# Patient Record
(demographics unavailable — no encounter records)

---

## 2024-11-04 NOTE — HISTORY OF PRESENT ILLNESS
[7] : 7 [5] : 5 [FreeTextEntry5] : 59 y.o patient is here for fu of his left knee today. PT 1x a week. pt states gel injections didn't provide much relief. pt states lateral knee is bulging.

## 2024-11-04 NOTE — ASSESSMENT
[FreeTextEntry1] : The patient comes in today for follow-up on his left knee.  He has persistent worsening pain in the left knee.  He has now had a series of Euflexxa injections recently which have not helped and cortisone previously.  Neither 1 of those things have helped him in physical therapy made him somewhat worse.  He has pain walking distances, doing stairs and occasionally at night.  He has noticed an increase deformity over the anterolateral portion of the knee as well as worsening of his varus deformity.  He struggles going from sitting to standing and getting in and out of a car.  Examination of the left lower extremity was normal neurovascular exam.  Examination left knee reveals limited range of motion from 5 to 115 degrees of varus deformity.  He has prominence along the anterolateral portion of the knee that he does not appear to have had previously.  He has a slight effusion.  There is no instability.  There is no apprehension.  There is no redness, rashes or visible scars.  Repeat of his x-rays today of the left knee 4 views weightbearing are unchanged from previous x-rays.  He has severe bone-on-bone arthritis of the medial compartment with tricompartmental changes.  He has severe varus deformity and subluxation.  There are no obvious tumors, mass or calcifications seen.  The plan at this time is to proceed with a left total knee replacement using the attune primary revision device.  I will get an MRI of the left knee just to make sure that the increased prominence anterolaterally is nothing of concern.  It may just end up being a meniscal cyst.  He has failed more than 3 months of conservative management.  All risks, benefits and alternatives of the procedure were discussed with the patient in detail to include but not limited to infection, blood clots, knee stiffness, and premature loosening of the implant and he wishes to proceed.  The patient has arthritis and end-stage joint disease of the knee supported by x-ray that demonstrated  the following: Periarticular osteophytes;  joint space narrowing; bone-on-bone articulation;  joint subluxation;  subchondral cysts;  and subchondral sclerosis.  One or more of the following conservative treatments have been tried and failed for 3 months or more: Anti-inflammatory medication; or analgesic medication; or at home exercises; or physical therapy; or the use of walker or cane; or weight loss; or use of a brace; or cortisone shot; or viscosupplementation therapies  The patient does not have any the following contraindications for joint replacement surgery: Active infection; or active systemic bacteremia; or active skin infection or open wound at surgical site; or neuropathic arthritis; or severe, rapidly progressive neurological disease; or severe medical conditions that make the risk of surgery outweigh the potential benefit.  I reviewed the plan of care as well as a model of the total knee implant equivalent to the one that would be used with a total knee replacement.  The patient agreed to the plan of care as well as use of implants for their total knee joint replacement.

## 2025-01-14 NOTE — PHYSICAL EXAM
[Well Developed] : well developed [Well Nourished] : well nourished [No Acute Distress] : no acute distress [Normal Conjunctiva] : normal conjunctiva [Normal Venous Pressure] : normal venous pressure [No Carotid Bruit] : no carotid bruit [Normal S1, S2] : normal S1, S2 [No Rub] : no rub [No Gallop] : no gallop [Clear Lung Fields] : clear lung fields [Good Air Entry] : good air entry [No Respiratory Distress] : no respiratory distress  [Soft] : abdomen soft [Non Tender] : non-tender [No Masses/organomegaly] : no masses/organomegaly [Normal Bowel Sounds] : normal bowel sounds [Normal Gait] : normal gait [No Edema] : no edema [No Cyanosis] : no cyanosis [No Clubbing] : no clubbing [No Varicosities] : no varicosities [No Rash] : no rash [No Skin Lesions] : no skin lesions [Moves all extremities] : moves all extremities [No Focal Deficits] : no focal deficits [Normal Speech] : normal speech [Alert and Oriented] : alert and oriented [Normal memory] : normal memory [de-identified] : +sys murmur

## 2025-01-14 NOTE — PHYSICAL EXAM
[No Acute Distress] : no acute distress [Well-Appearing] : well-appearing [Normal Sclera/Conjunctiva] : normal sclera/conjunctiva [Normal Outer Ear/Nose] : the outer ears and nose were normal in appearance [Normal Oropharynx] : the oropharynx was normal [No Lymphadenopathy] : no lymphadenopathy [Supple] : supple [No Respiratory Distress] : no respiratory distress  [Clear to Auscultation] : lungs were clear to auscultation bilaterally [Normal Rate] : normal rate  [Regular Rhythm] : with a regular rhythm [Normal S1, S2] : normal S1 and S2 [Grossly Normal Strength/Tone] : grossly normal strength/tone [Coordination Grossly Intact] : coordination grossly intact [Normal Affect] : the affect was normal [Normal Insight/Judgement] : insight and judgment were intact [de-identified] : slight lateral left knee swelling, crepitus appreciated with flexion and extension, negative anterior drawer test

## 2025-01-14 NOTE — DISCUSSION/SUMMARY
[EKG obtained to assist in diagnosis and management of assessed problem(s)] : EKG obtained to assist in diagnosis and management of assessed problem(s) [FreeTextEntry1] : Pt is a 61 y/o M smoker PMH HLD (has not started statin) family hx: mother MI 44, brother CAD/PCI 60's/"leaky valve", sister "leaky valve" .   Pt is scheduled for L knee surgery 01/21/2025 at  with Dr Marshall  Will check transthoracic echocardiogram to evaluate left ventricular function and assess for any structural abnormalities Further preop recommendations will be made once diagnostic testing is complete.   Family History: We discussed the importance of aggressive risk factor modification, including continuing medications as directed, following a healthy diet of unprocessed, low saturated fat and carbohydrate diet as close to a plant based or Mediterranean as possible, regular exercise at least 30 minutes of cardiovascular exercise daily.  Also advised to report any symptoms immediately.   HLD: c/w statin Advised lifestyle modifications   Most recent available lab results were reviewed with pt. The described plan was discussed with the pt.  All questions and concerns were addressed to the best of my knowledge.

## 2025-01-14 NOTE — HISTORY OF PRESENT ILLNESS
[FreeTextEntry1] : Pt is a 61 y/o M smoker PMH HLD (has not started statin) family hx: mother MI 44, brother CAD/PCI 60's/"leaky valve", sister "leaky valve" .   Pt is scheduled for L knee surgery 01/21/2025 at  with Dr Marshall Pt reports feeling well and has no active cardiac complaints - denies CP, SOB, palpitations, dizziness, syncope, edema, orthopnea, PND, orthopnea.  No exertional symptoms.  ETT 11/2023 no ischemic changes 10.1 METS CUS 09/2023 mild plaque, sluggish flow CTV head 09/2023 normal intracranial CT venogram  PMH: anxiety, asthma Family hx: mother MI 44, brother CAD/PCI 60's/"leaky valve", sister "leaky valve" Smoking status: 8-9 cigarettes social ETOH no drug use Current exercise: none  Daily water intake: 1 quart Daily caffeine intake: 4-5 cups coffee OTC medications: advil PRN Previous hospitalizations: shoulder surgery x2 - no problems with anesthesia

## 2025-01-14 NOTE — ASSESSMENT
[Patient Optimized for Surgery] : Patient optimized for surgery [Echocardiogram] : echocardiogram [Modify medications prior to procedure] : Modify medications prior to procedure [As per surgery] : as per surgery [FreeTextEntry4] : 59yo male with PMH of asthma, HLD, current smoker who presents to the office for preoperative examination. He is planned for left total knee replacement with Dr. Michael Marshall on 1/21/2025 at [facility].  - [facility] labs, ECG and CXR were obtained and personally reviewed - Labs within normal limits. MSSA PCR with staph aureus detected. Sent mupirocin from [facility] department  - ECG shows sinus bradycardia, rate 56 with voltage criteria for LVH. Cardiologist planning for TTE to evaluated LV function and assess structural abnormalities - CXR with nondisplaced fractures of the right eigth and ninth ribs. Okay for clearance - No adverse anesthesia reactions in the past - Able to perform >4 METs at baseline - At this time there are no acute medical contraindications to procedure and patient is medically optimized to proceed with planned procedure once cardiology clearance is received  [FreeTextEntry7] : hold NSAIDs prior to procedure

## 2025-01-14 NOTE — REVIEW OF SYSTEMS
[Fever] : no fever [Chills] : no chills [Earache] : no earache [Nasal Discharge] : no nasal discharge [Sore Throat] : no sore throat [Chest Pain] : no chest pain [Palpitations] : no palpitations [Shortness Of Breath] : no shortness of breath [Cough] : no cough [Joint Pain] : joint pain [Joint Stiffness] : joint stiffness [Joint Swelling] : joint swelling [Headache] : no headache [Dizziness] : no dizziness

## 2025-01-14 NOTE — HISTORY OF PRESENT ILLNESS
[Asthma] : asthma [Smoker] : smoker [Aortic Stenosis] : no aortic stenosis [Atrial Fibrillation] : no atrial fibrillation [Coronary Artery Disease] : no coronary artery disease [Recent Myocardial Infarction] : no recent myocardial infarction [Implantable Device/Pacemaker] : no implantable device/pacemaker [COPD] : no COPD [Sleep Apnea] : no sleep apnea [Family Member] : no family member with adverse anesthesia reaction/sudden death [Self] : no previous adverse anesthesia reaction [Chronic Anticoagulation] : no chronic anticoagulation [Chronic Kidney Disease] : no chronic kidney disease [Diabetes] : no diabetes [(Patient denies any chest pain, claudication, dyspnea on exertion, orthopnea, palpitations or syncope)] : Patient denies any chest pain, claudication, dyspnea on exertion, orthopnea, palpitations or syncope [Good (7-10 METs)] : Good (7-10 METs) [FreeTextEntry1] : Left total knee replacement [FreeTextEntry2] : 1/21/25 [FreeTextEntry3] : Dr Michael Marshall [FreeTextEntry4] : 59yo male with PMH of asthma, HLD, current smoker who presents to the office for preoperative examination.   Patient with history of left knee osteoarthritis. Has been following up with orthopedics, underwent gel injections and cortisone injections without improvement. He is planned for left total knee replacement with Dr. Michael Marshall on 1/21/2025 at [facility].   At [facility], patient with ecchymosis on the right lateral ribcage s/p fall. He underwent CXR which revealed nondisplaced fractures of the right eighth and ninth ribs. Reports a fall on a windowsill a few weeks ago. Pain is improving, avoiding NSAIDs.  [FreeTextEntry7] : Cardiology Dr. Berrios 1/14/25: ECG with sinus bradycardia, rate 56 with voltage criteria for LVH. Cardiologist planning for TTE to evaluated LV function and assess structural abnormalities

## 2025-02-21 NOTE — ASSESSMENT
[FreeTextEntry1] : The patient comes in today for follow-up on his left knee.  He is now 4 weeks out from his left total knee replacement done on January 21, 2025.  He is doing very well.  He walks mostly without a cane or assistive device.  He still having some trouble with flexion.  He is currently doing physical therapy.  Examination of his left lower extremity reveals normal neurovascular exam.  Examination left knee reveals well-healed midline scar with no signs of infection or DVT.  His range of motion is 0 to 95 degrees.  He has normal patella tracking.  There is no crepitation or instability.  There is no redness or rashes.  X-rays done in the office today left knee 4 views show the left total knee prosthesis normal alignment in good position with no signs of loosening or wear.  There are no obvious tumors, masses or calcifications seen.  The plan at this time is physical therapy.  I will see him back in the office in 2 months.

## 2025-04-16 NOTE — ASSESSMENT
[FreeTextEntry1] : The patient comes in today follow-up on his total knee replacement.  He is now almost 3 months out from his left total knee replacement done on January 21, 2025.  He is doing very well.  He is getting himself back to work.  He stopped his physical therapy because of the co-pay.  He is doing home exercises.  Examination of the left lower extremity feels normal neurovascular exam.  Examination of the left knee reveals a well-healed midline scar.  There is no effusion.  There is no signs of infection or DVT.  His range of motion is 0 to 115 degrees.  I can push him to 120 with some mild discomfort.  He has normal patella tracking with no apprehension or crepitation.  There is no instability.  The plan at this time is to continue home exercises.  He will resume activities as tolerated.  I will see him back in the office as needed.

## 2025-05-19 NOTE — REVIEW OF SYSTEMS
[Abdominal Pain] : abdominal pain [Muscle Pain] : muscle pain [Fever] : no fever [Chills] : no chills [Nausea] : no nausea [Constipation] : no constipation [Diarrhea] : no diarrhea [Vomiting] : no vomiting [Dysuria] : no dysuria [Frequency] : no frequency

## 2025-05-19 NOTE — ASSESSMENT
[FreeTextEntry1] : #Inguinal pain/bulge - Presenting with left sided inguinal bulging for the past week [facility] - Hernia present on examination - Will check US to confirm - General surgery referral

## 2025-05-19 NOTE — HISTORY OF PRESENT ILLNESS
[FreeTextEntry8] : 61yo male presenting to the office complaining of inguinal bulge. Reports that he tweaked his back a week ago, was lifting up something heavy at an awkward position.  Reports feeling bulge in his left pelvic region with pain.  Denies changes in bowel habits, nausea or vomiting.  Thought it was muscular at first, but now with cramping.

## 2025-05-19 NOTE — PHYSICAL EXAM
[No Acute Distress] : no acute distress [Well-Appearing] : well-appearing [Soft] : abdomen soft [Non Tender] : non-tender [Normal Affect] : the affect was normal [Normal Insight/Judgement] : insight and judgment were intact [de-identified] : left inguinal bulge, reducible

## 2025-07-29 NOTE — DISCUSSION/SUMMARY
[EKG obtained to assist in diagnosis and management of assessed problem(s)] : EKG obtained to assist in diagnosis and management of assessed problem(s) [FreeTextEntry1] : Pt is a 61 y/o M smoker PMH HLD (has not started statin) family hx: mother MI 44, brother CAD/PCI 60's/"leaky valve", sister "leaky valve" .   This past weekend he had an episode of CP at work while active, non-radiating, lasted a few minutes. check CCTA Advised pt to go to the nearest ED if symptoms persist or worsen  Family History: We discussed the importance of aggressive risk factor modification, including continuing medications as directed, following a healthy diet of unprocessed, low saturated fat and carbohydrate diet as close to a plant based or Mediterranean as possible, regular exercise at least 30 minutes of cardiovascular exercise daily.  Also advised to report any symptoms immediately.   HLD: c/w crestor 10mg qd check labs Advised lifestyle modifications   Most recent available lab results were reviewed with pt. The described plan was discussed with the pt.  All questions and concerns were addressed to the best of my knowledge.     Today's office visit encompassed 32 minutes which excludes teaching and separately reported services. I conducted an extensive history, physical exam and reviewed diagnosis and treatment options including diagnostic tests, radiology studies including cat scans and the use of prescription medication.

## 2025-07-29 NOTE — PHYSICAL EXAM
[Well Developed] : well developed [Well Nourished] : well nourished [No Acute Distress] : no acute distress [Normal Conjunctiva] : normal conjunctiva [Normal Venous Pressure] : normal venous pressure [No Carotid Bruit] : no carotid bruit [Normal S1, S2] : normal S1, S2 [No Rub] : no rub [No Gallop] : no gallop [Clear Lung Fields] : clear lung fields [Good Air Entry] : good air entry [No Respiratory Distress] : no respiratory distress  [Soft] : abdomen soft [Non Tender] : non-tender [No Masses/organomegaly] : no masses/organomegaly [Normal Bowel Sounds] : normal bowel sounds [Normal Gait] : normal gait [No Edema] : no edema [No Cyanosis] : no cyanosis [No Clubbing] : no clubbing [No Varicosities] : no varicosities [No Rash] : no rash [No Skin Lesions] : no skin lesions [Moves all extremities] : moves all extremities [No Focal Deficits] : no focal deficits [Normal Speech] : normal speech [Alert and Oriented] : alert and oriented [Normal memory] : normal memory [de-identified] : +sys murmur

## 2025-07-29 NOTE — HISTORY OF PRESENT ILLNESS
[FreeTextEntry1] : Pt is a 59 y/o M smoker PMH HLD (has not started statin), mod TR family hx: mother MI 44, brother CAD/PCI 60's/"leaky valve", sister "leaky valve".   This past weekend he had an episode of CP at work while active, non-radiating, lasted a few minutes.  He denies SOB, diaphoresis, palpitations, dizziness, syncope, LE edema, PND, orthopnea.  TTE 01/2025 EF 57%, mod TR, mild MR ETT 11/2023 no ischemic changes 10.1 METS CUS 09/2023 mild plaque, sluggish flow CTV head 09/2023 normal intracranial CT venogram  additional PMH: anxiety, asthma Smoking status: 8-9 cigarettes social ETOH no drug use Current exercise: none although active at work Daily water intake: 1 quart Daily caffeine intake: 6-8 cups coffee OTC medications: advil PRN Previous hospitalizations: shoulder surgery x2, L knee surgery 01/21/2025 - no problems with anesthesia